# Patient Record
Sex: FEMALE | URBAN - NONMETROPOLITAN AREA
[De-identification: names, ages, dates, MRNs, and addresses within clinical notes are randomized per-mention and may not be internally consistent; named-entity substitution may affect disease eponyms.]

---

## 2018-01-03 ENCOUNTER — APPOINTMENT (RX ONLY)
Dept: URBAN - NONMETROPOLITAN AREA CLINIC 22 | Facility: CLINIC | Age: 58
Setting detail: DERMATOLOGY
End: 2018-01-03

## 2018-01-03 DIAGNOSIS — L40.0 PSORIASIS VULGARIS: ICD-10-CM

## 2018-01-03 PROCEDURE — ? PRESCRIPTION

## 2018-01-03 PROCEDURE — ? FOLLOW UP FOR NEXT VISIT

## 2018-01-03 PROCEDURE — 99202 OFFICE O/P NEW SF 15 MIN: CPT

## 2018-01-03 PROCEDURE — ? COUNSELING

## 2018-01-03 PROCEDURE — ? TREATMENT REGIMEN

## 2018-01-03 RX ORDER — HALOBETASOL PROPIONATE 0.5 MG/G
0.05% OINTMENT TOPICAL BID
Qty: 1 | Refills: 1 | Status: ERX | COMMUNITY
Start: 2018-01-03

## 2018-01-03 RX ADMIN — HALOBETASOL PROPIONATE 0.05%: 0.5 OINTMENT TOPICAL at 19:28

## 2018-01-03 ASSESSMENT — LOCATION DETAILED DESCRIPTION DERM
LOCATION DETAILED: RIGHT DORSAL MIDDLE METACARPOPHALANGEAL JOINT
LOCATION DETAILED: LEFT DORSAL MIDDLE METACARPOPHALANGEAL JOINT
LOCATION DETAILED: RIGHT DORSAL INDEX METACARPOPHALANGEAL JOINT
LOCATION DETAILED: 2ND WEB SPACE LEFT HAND
LOCATION DETAILED: LEFT ELBOW

## 2018-01-03 ASSESSMENT — LOCATION ZONE DERM
LOCATION ZONE: ARM
LOCATION ZONE: HAND

## 2018-01-03 ASSESSMENT — LOCATION SIMPLE DESCRIPTION DERM
LOCATION SIMPLE: LEFT HAND
LOCATION SIMPLE: RIGHT HAND
LOCATION SIMPLE: LEFT ELBOW

## 2018-01-03 NOTE — HPI: RASH
How Severe Is Your Rash?: mild
Is This A New Presentation, Or A Follow-Up?: Rash
Additional History: Patient states that she was initially given Clobetasol ointment 2 years ago. She states that she used this medication for a few months and if failed to help alleviate her symptoms so she stopped it. Patient is now here for further evaluation and treatment.

## 2018-01-03 NOTE — PROCEDURE: TREATMENT REGIMEN
Action 4: Continue
Plan: Discussed diagnosis and treatment options with patient today. Informed patient that this is a chronic condition and she will have occasional flair ups from time to time. Discussed treatment with topical medications or intralesional Kenalog injections. Informed patient that injections may be done today and she may use topical medications at the first sign of symptoms. Patient states that she declines treatment with injections at this time and would like to continue with topical medications. Informed patient that new RX will be sent into her pharmacy today. Informed patient that if her symptoms fail to improve with topical medications, she may follow up in 2 months for steroid injections. Recommended for patient to begin treatment as directed today. Patient voiced understanding
Detail Level: Zone
Continue Regimen: - Halobetasol Ointment 0.05% - Apply to affected areas on the body BID X 2 weeks then take a 2 week break. May repeat